# Patient Record
Sex: FEMALE | Race: BLACK OR AFRICAN AMERICAN | NOT HISPANIC OR LATINO | URBAN - METROPOLITAN AREA
[De-identification: names, ages, dates, MRNs, and addresses within clinical notes are randomized per-mention and may not be internally consistent; named-entity substitution may affect disease eponyms.]

---

## 2024-10-01 ENCOUNTER — EMERGENCY (EMERGENCY)
Facility: HOSPITAL | Age: 34
LOS: 0 days | Discharge: HOME | End: 2024-10-01
Attending: STUDENT IN AN ORGANIZED HEALTH CARE EDUCATION/TRAINING PROGRAM
Payer: COMMERCIAL

## 2024-10-01 VITALS
TEMPERATURE: 98 F | WEIGHT: 156.09 LBS | HEART RATE: 76 BPM | OXYGEN SATURATION: 97 % | RESPIRATION RATE: 18 BRPM | HEIGHT: 65 IN | SYSTOLIC BLOOD PRESSURE: 109 MMHG | DIASTOLIC BLOOD PRESSURE: 77 MMHG

## 2024-10-01 VITALS
HEART RATE: 80 BPM | DIASTOLIC BLOOD PRESSURE: 71 MMHG | SYSTOLIC BLOOD PRESSURE: 108 MMHG | OXYGEN SATURATION: 99 % | TEMPERATURE: 98 F | RESPIRATION RATE: 19 BRPM

## 2024-10-01 DIAGNOSIS — M79.631 PAIN IN RIGHT FOREARM: ICD-10-CM

## 2024-10-01 DIAGNOSIS — M25.531 PAIN IN RIGHT WRIST: ICD-10-CM
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
## 2024-10-01 PROCEDURE — 73090 X-RAY EXAM OF FOREARM: CPT | Mod: 26,RT

## 2024-10-01 PROCEDURE — 99283 EMERGENCY DEPT VISIT LOW MDM: CPT

## 2024-10-01 PROCEDURE — 73110 X-RAY EXAM OF WRIST: CPT | Mod: 26,RT

## 2024-10-01 RX ORDER — LIDOCAINE/BENZALKONIUM/ALCOHOL
1 SOLUTION, NON-ORAL TOPICAL ONCE
Refills: 0 | Status: COMPLETED | OUTPATIENT
Start: 2024-10-01 | End: 2024-10-01

## 2024-10-01 RX ORDER — ACETAMINOPHEN 325 MG/1
650 TABLET ORAL ONCE
Refills: 0 | Status: COMPLETED | OUTPATIENT
Start: 2024-10-01 | End: 2024-10-01

## 2024-10-01 RX ADMIN — ACETAMINOPHEN 650 MILLIGRAM(S): 325 TABLET ORAL at 11:17

## 2024-10-01 RX ADMIN — Medication 1 PATCH: at 11:18

## 2024-10-01 NOTE — ED ADULT NURSE NOTE - NSFALLUNIVINTERV_ED_ALL_ED
Bed/Stretcher in lowest position, wheels locked, appropriate side rails in place/Call bell, personal items and telephone in reach/Instruct patient to call for assistance before getting out of bed/chair/stretcher/Non-slip footwear applied when patient is off stretcher/[place] to call system/Physically safe environment - no spills, clutter or unnecessary equipment/Purposeful proactive rounding/Room/bathroom lighting operational, light cord in reach

## 2024-10-01 NOTE — ED PROVIDER NOTE - ATTENDING APP SHARED VISIT CONTRIBUTION OF CARE
33 y/o F no pmhx presents w/ R wrist pain. R hand dominant. endorsing pain into the R forearm. Denies numbness/tingling.  has had pain since thursday when she was pushing something at home. denies FOOSH.  neurovascularly intact RUE   no significant tenderness on exam  xray - r/o fracture , low suspicion  no scaphoid tenderness, no tenderness over 5th MCP. radial pulse 2+, compartments are soft RUE.   pain control    I performed a history and physical exam of the patient and discussed their management with the ANASTACIO. I have reviewed the ANASTACIO note and agree with the documented findings and plan of care, except as noted. This was a shared visit with an ANASTACIO. I reviewed and verified the documentation and independently performed my own history/exam/medical decision making. My medical decision making and observations are found above. Please refer to any progress notes for updates on clinical course.

## 2024-10-01 NOTE — ED PROVIDER NOTE - CLINICAL SUMMARY MEDICAL DECISION MAKING FREE TEXT BOX
35 y/o F no pmhx presents w/ R wrist pain. R hand dominant. endorsing pain into the R forearm. Denies numbness/tingling.  has had pain since thursday when she was pushing something at home. denies FOOSH.  neurovascularly intact RUE   no significant tenderness on exam  xray - r/o fracture , low suspicion  no scaphoid tenderness, no tenderness over 5th MCP. radial pulse 2+, compartments are soft RUE.   pain control 33 y/o F no pmhx presents w/ R wrist pain. R hand dominant. endorsing pain into the R forearm. Denies numbness/tingling.  has had pain since thursday when she was pushing something at home. denies FOOSH.  neurovascularly intact RUE   no significant tenderness on exam  xray - r/o fracture , low suspicion  no scaphoid tenderness, no tenderness over 5th MCP. radial pulse 2+, compartments are soft RUE.   pain control    LAURA Wilcox: received pt in results waiting section of ed.   33 y/o without PMH presents with R wrist pain. no ttp. from throughout.   2+ pulses cap refill brisk.   placed in wrist brace,   Reviewed all results and necessity for follow up. Counseled on red flags and to return for them.  Patient appears well on discharge.

## 2024-10-01 NOTE — ED ADULT NURSE NOTE - OBJECTIVE STATEMENT
34F PMHx anemia presents to the ED with c/o r wirst pain since thursday s/p pushing dresser while arranging her room. Patient states that she now has pain on her dorsal hand and right forearm since sunday. Patient took aleve on saturday which provided mild relief.

## 2024-10-01 NOTE — ED PROVIDER NOTE - NSFOLLOWUPINSTRUCTIONS_ED_ALL_ED_FT
Wrist Pain, Adult  There are many things that can cause wrist pain. Some common causes include:  An injury to the wrist.  Using the joint too much.  A condition that causes too much pressure to be put on a nerve in the wrist (carpal tunnel syndrome).  Wear and tear of the joints that happens as a person gets older (osteoarthritis).  A condition that causes swelling and stiffness in the joints (arthritis).  Sometimes, the cause of wrist pain is not known.    Often, the pain goes away when you follow your doctor's instructions for easing pain at home. This may include resting your wrist, icing your wrist, or using a splint or an elastic wrap for a short time. It is important to tell your doctor if your wrist pain does not go away.    Follow these instructions at home:  If you have a splint or elastic wrap that can be taken off:    Wear the splint or wrap as told by your doctor. Take it off only as told by your doctor. Ask if you can take it off for bathing.  Check the skin around the splint or wrap every day. Tell your doctor if you see problems.  Loosen the splint or wrap if your fingers:  Tingle.  Become numb.  Turn cold and blue.  Keep the splint or wrap clean.  If the splint or wrap is not waterproof:  Do not let it get wet.  Cover it with a watertight covering when you take a bath or shower.  Managing pain, stiffness, and swelling    A bag of ice on a towel on the skin.  If told, put ice on the painful area.  If you have a removable splint or wrap, take it off as told by your doctor.  Put ice in a plastic bag.  Place a towel between your skin and the bag or between your splint or wrap and the bag.  Leave the ice on for 20 minutes, 2–3 times a day.  If your skin turns bright red, take off the ice right away to prevent skin damage. The risk of damage is higher if you cannot feel pain, heat, or cold.  Move your fingers often.  Raise the injured area above the level of your heart while you are sitting or lying down.  Activity    Rest your wrist as told by your doctor.  Return to your normal activities when your doctor says that it is safe.  Ask your doctor when it is safe to drive if you have a splint or wrap on your wrist.  Do exercises as told by your doctor.  General instructions    Pay attention to any changes in your symptoms.  Take over-the-counter and prescription medicines only as told by your doctor.  Contact a doctor if:  You have a sudden, sharp pain in the wrist, hand, or arm that is different or new.  Any swelling or bruising on your wrist or hand gets worse.  Your skin:  Becomes red.  Has a rash.  Has open sores.  Your pain does not get better.  Your pain gets worse.  You have a fever or chills.  Get help right away if:  You lose feeling in your fingers or hand.  Your fingers turn white, very red, or cold and blue.  You cannot move your fingers.  This information is not intended to replace advice given to you by your health care provider. Make sure you discuss any questions you have with your health care provider.

## 2024-10-01 NOTE — ED PROVIDER NOTE - CARE PROVIDER_API CALL
your pmd in 1-3 days,   Phone: (   )    -  Fax: (   )    -  Follow Up Time:     [facility]  [address] [place] [address], [facility] [address]  [place] [address]  Phone: [phone]  Fax: [phone]  Follow Up Time: 1-3 Days

## 2024-10-01 NOTE — ED PROVIDER NOTE - NS ED ATTENDING STATEMENT MOD
Attending Only This was a shared visit with the ANASTACIO. I reviewed and verified the documentation.

## 2024-10-01 NOTE — ED PROVIDER NOTE - OBJECTIVE STATEMENT
33 y/o F no pmhx presents w/ R wrist pain. R hand dominant. endorsing pain into the R forearm. Denies numbness/tingling.  has had pain since thursday when she was pushing something at home. denies FOOSH.

## 2024-10-01 NOTE — ED PROVIDER NOTE - PATIENT PORTAL LINK FT
You can access the FollowMyHealth Patient Portal offered by [facility] by registering at the following website: [email or web address]. By joining [facility]’s FollowMyHealth portal, you will also be able to view your health information using other applications (apps) compatible with our system.

## 2024-10-01 NOTE — ED PROVIDER NOTE - PHYSICAL EXAMINATION
General: Well appearing female in no acute distress  HEENT: Normocephalic, atraumatic. Moist mucous membranes. Oropharynx clear. No lymphadenopathy.  Eyes: No scleral icterus. EOMI. ARMINDA.  Neck:. Soft and supple. Full ROM without pain. No midline tenderness  Cardiac: Regular rate and regular rhythm. No murmurs, rubs, gallops. Peripheral pulses 2+ and symmetric. No LE edema.  Resp: Lungs CTAB. Speaking in full sentences. No wheezes, rales or rhonchi.  Abd: Soft, non-tender, non-distended. No guarding or rebound. No scars, masses, or lesions.  Back: Spine midline and non-tender. No CVA tenderness.    Skin: No rashes, abrasions, or lacerations.  Neuro: AO x 3. Moves all extremities symmetrically. Motor strength and sensation grossly intact.